# Patient Record
Sex: FEMALE | Race: WHITE
[De-identification: names, ages, dates, MRNs, and addresses within clinical notes are randomized per-mention and may not be internally consistent; named-entity substitution may affect disease eponyms.]

---

## 2018-02-07 ENCOUNTER — HOSPITAL ENCOUNTER (OUTPATIENT)
Dept: HOSPITAL 74 - JASU-SURG | Age: 33
Discharge: HOME | End: 2018-02-07
Attending: OBSTETRICS & GYNECOLOGY
Payer: COMMERCIAL

## 2018-02-07 VITALS — HEART RATE: 79 BPM | DIASTOLIC BLOOD PRESSURE: 62 MMHG | SYSTOLIC BLOOD PRESSURE: 101 MMHG

## 2018-02-07 VITALS — BODY MASS INDEX: 19.7 KG/M2

## 2018-02-07 VITALS — TEMPERATURE: 98.2 F

## 2018-02-07 DIAGNOSIS — O02.0: Primary | ICD-10-CM

## 2018-02-07 PROCEDURE — 0UDB7ZX EXTRACTION OF ENDOMETRIUM, VIA NATURAL OR ARTIFICIAL OPENING, DIAGNOSTIC: ICD-10-PCS | Performed by: OBSTETRICS & GYNECOLOGY

## 2018-02-07 NOTE — HP
Past Medical History





- Primary Care Physician


PCP:: Papito Harris





- Admission


Chief Complaint: 31yo  with h/o D&C for partial molar pregnancy and 

plateued BHCG due to possible retained placental tissue.


History of Present Illness: 





s/p D&C 2017 for partial molar pregnancy with plateaued BHCG and possible 

retained products of conception.


Mildly elevated LFT


History Source: Patient, Medical Record


Limitations to Obtaining History: No Limitations





- Past Medical History


CNS: No: Alzheimer's, CVA, Dementia, Migraine, Multiple Sclerosis, Peripheral 

Neuropathy, Parkinson's, Seizure, Syncope, TIA, Vertigo, Other


Cardiovascular: No: AFIB, Aneurysm, Aortic Insufficiency, Aortic Stenosis, CAD, 

CHF, Deep Vein Thrombosis, HTN, Hyperlipdemia, MI, Mitral Insufficiency, Mitral 

Stenosis, Murmur, Pulmonary Hypertension, Other


Pulmonary: No: Asthma, Bronchitis, Cancer, COPD, O2 Dependent, Pneumonia, 

Previously Intubated, Pulmonary Embolus, Pulmonary Fibrosis, Sleep Apnea, Other


Hepatobiliary: Yes: Other (Mildly elevated LFT due to "?autoimmune hepatitis")


Renal/: No: Renal Failure, Renal Inusuff, BPH, Cancer, Hematuria, Hemodialysis

, Neurogenic Bladder, Renal Calculi, UTI, Other


Reproductive: Yes: Other (partial molar pregnancy 2017)


...: 1


...Para: 0


Heme/Onc: No: Anemia, B12 Deficiency, Bleeding Disorder, Cancer, Current 

Chemotherapy, Current Radiation Therapy, Hemochromatosis, Hypercoaguable State, 

Myeloproliferative Synd, Sickle Cell Disease, Sickle Cell Trait, 

Thrombocytopenia, Other


Infectious Disease: No: AIDS, C-Diff, Herpes Zoster, HIV, MRSA, STD's, 

Tuberculosis, VREF, Other


Psych: Yes: Panic


Rheumatology: No: Fibromyalgia, Gout, Lupus, Rheumatoid Arthritis, Sarcoidosis, 

Vasculitis, Other


ENT: No: Allergic Rhinitis, Sinusitis, Other


Endocrine: No: Keldron's Disease, Cushing's Disease, Diabetes Insipidus, 

Diabetes Mellitus, Hyperparathyroidism, Hyperthyroidism, Hypothyroidism, 

Osteopenia, SIADH, Other


Dermatology: No: Basal Cell, Cellulitis, Eczema, Melanoma, Psoriasis, Squamous 

Cell, Other





- Past Surgical History


Hx Myomectomy: No


Hx Transabdominal Cerclage: No


Additional Surgical History: D&C 2017





- Smoking History


Smoking history: Never smoked





- Alcohol/Substance Use


Hx Alcohol Use: No


History of Substance Use: reports: None





- Social History


Usual Living Arrangement: Yes: With Spouse


ADL: Independent


History of Recent Travel: No





Home Medications





- Allergies


Allergies/Adverse Reactions: 


 Allergies











Allergy/AdvReac Type Severity Reaction Status Date / Time


 


No Known Allergies Allergy   Verified 18 14:06














- Home Medications


Home Medications: 


Ambulatory Orders





Pnv No.95/Ferrous Fum/Folic AC [Prenatal Vitamin Tablet] 1 each PO DAILY  











Family Disease History





- Family Disease History


Family History: Denies





Review of Systems





- Review of Systems


Constitutional: reports: No Symptoms


Eyes: reports: No Symptoms


HENT: reports: No Symptoms


Neck: reports: No Symptoms


Cardiovascular: reports: No Symptoms


Respiratory: reports: No Symptoms


Gastrointestinal: reports: No Symptoms


Genitourinary: reports: Vaginal Bleeding (light)


Breasts: reports: No Symptoms Reported


Musculoskeletal: reports: No Symptoms


Integumentary: reports: No Symptoms


Neurological: reports: No Symptoms


Endocrine: reports: No Symptoms


Hematology/Lymphatic: reports: No Symptoms


Psychiatric: reports: No Symptoms


Pain Intensity: 0





Physical Exam-GYN


Vital Signs: 


 Vital Signs











Temperature  98.4 F   18 14:04


 


Pulse Rate  96 H  18 14:04


 


Respiratory Rate  20   18 14:04


 


Blood Pressure  107/76   18 14:04


 


O2 Sat by Pulse Oximetry (%)  97   18 14:03











Constitutional: Yes: Well Nourished, No Distress, Calm


Eyes: Yes: WNL, Conjunctiva Clear


HENT: Yes: WNL, Atraumatic, Normocephalic


Neck: Yes: WNL, Supple, Trachea Midline


Cardiovascular: Yes: WNL, Regular Rate and Rhythm


Respiratory: Yes: WNL, Regular, CTA Bilaterally


Gastrointestinal: Yes: WNL, Normal Bowel Sounds, Soft


...Rectal Exam: Yes: Deferred


Renal/: Yes: WNL


Pelvis: Yes: WNL


External Genitalia: Yes: Normal


Internal Exam Deferred: No


Vaginal Exam: Yes: Normal


Cervix: Yes: Normal


Uterus: Yes: Normal


Adnexa: Normal: Left, Right


Breast(s): Yes: WNL


Musculoskeletal: Yes: WNL


Extremities: Yes: WNL


Edema: No


Integumentary: Yes: WNL


Neurological: Yes: WNL, Alert, Oriented


...Motor Strength: WNL


Psychiatric: Yes: WNL, Alert, Oriented





Imaging





- Results


Ultrasound: Report Reviewed, Image Reviewed





Assessment/Plan





31yo  with h/o D&C for partial molar pregnancy and plateued BHCG due to 

possible retained placental tissue. We had a long discussion about the risks, 

benefits, and alternatives of surgery. I explained the risks of infection, 

bleeding, scarring, amenorrhea, Asherman's syndrome, infertility, perforation, 

need for additional surgery to treat any complications, etc. The pt declined 

expectant management of partial molar pregnancy. She requested to proceed with 

surgery.

## 2018-02-08 NOTE — OP
DATE OF OPERATION:  02/07/2018

 

PREOPERATIVE DIAGNOSIS:  Partial molar pregnancy, retained products of conception.

 

POSTOPERATIVE DIAGNOSIS:  Partial molar pregnancy, retained products of conception.

 

PROCEDURE:  Suction dilatation and curettage, real-time ultrasound guidance.

 

SURGEON:  Papito Harris MD

 

ANESTHESIOLOGIST:  Yecenia Roque MD

 

ANESTHESIA:  General.

 

COMPLICATIONS:  None.

 

INTRAVENOUS FLUIDS:  300 mL of crystalloids.

 

ESTIMATED BLOOD LOSS:  10 mL

 

FINDINGS:  Examination under anesthesia revealed a small anteverted uterus with no

pelvic or adnexal masses.  The cervical os was closed.  A small amount of bleeding

was noted from the uterus.  Preoperative ultrasound revealed a small anteverted

uterus with thickened endometrial echo as well as a left hemorrhagic ovarian cyst. 

Intraoperative ultrasound guidance was performed during the entire surgical

procedure, and postoperative ultrasound confirmed no retained products of conception.

 

DESCRIPTION OF PROCEDURE:  The patient was met preoperatively.  Risks, benefits, and

alternatives of surgery were discussed in details.  All questions were answered.  The

consent form was reviewed and signed.  The patient verbalized her understanding.

 

She was then brought to the OR with IV running.  She was placed on the surgical table

in a supine position.  The general anesthesia was achieved without difficulty.  The

patient was then placed in a dorsal lithotomy position using adjustable Elias

stirrups.  The patient was examined under anesthesia with the findings as described

above.  The patient was then prepped and draped in the usual sterile fashion.  A

timeout procedure was conducted as per standard protocol.  A sterile speculum was

introduced inside the vagina.  The cervix was grasped with a single-tooth tenaculum. 

The cervical os was dilated carefully to accommodate size 23 Mays dilator.  A 7-mm

suction curette was then introduced gently into the uterine cavity under direct

guidance with the ultrasound.  A suction curettage was performed.  All of the tissue

was removed from the patient's uterus under ultrasound guidance.  Once this was

completed, all the instruments were removed.  An ultrasound was performed once again

to assure no retained products of conception.  Good hemostasis was confirmed. 

Sponge, lap, needle counts were correct.  The patient was returned to supine

position.  She was then transferred to recovery room in stable condition and awake.

 

 

CARYL [id number]

DD: 02/07/2018 16:13

DT: 02/08/2018 07:56

Job #:  [id number]

## 2018-02-09 NOTE — PATH
Surgical Pathology Report



Patient Name:  GREYSON MONDRAGON

Accession #:  

[facility]. Rec. #:  [id number]                                                        

   /Age/Gender:  1985 (Age: 32) / F

Account:  [id number]                                                          

             Location: [facility] SURGICAL

Taken:  2018

Received:  2018

Reported:  2018

Physicians:  Papito Harris M.D.

  



Specimen(s) Received

 CONTENTS OF MOLAR PREGNANCY 





Clinical History

Incomplete molar pregnancy







Final Diagnosis

UTERINE CONTENTS, CURETTINGS:

DEGENERATED CHORIONIC VILLI CONSISTENT WITH PRODUCTS OF CONCEPTION, ALONG WITH

NECROTIC MATERIAL, ENDOMETRIUM, AND BENIGN CERVICAL TISSUE.

NO CARCINOMA IDENTIFIED. 



Comment: Many of the chorionic villi show degenerative changes and are

sclerotic. Evaluation of molar pregnancy may be better performed on a prior

sample. Recommend correlation with clinical findings and followup as clinically

indicated.





***Electronically Signed***

Aldair Campoverde M.D.





Gross Description

Received in formalin labeled "contents of molar pregnancy," is a 7.5 x 5.3 x 0.5

cm aggregate of tan-brown soft tissue fragments. No definite villous tissue or

fetal somatic tissue is identified. Representative sections are submitted in 3

cassettes.

/2018



saudi/[email or web address]